# Patient Record
Sex: FEMALE | Race: OTHER | NOT HISPANIC OR LATINO | Employment: OTHER | URBAN - METROPOLITAN AREA
[De-identification: names, ages, dates, MRNs, and addresses within clinical notes are randomized per-mention and may not be internally consistent; named-entity substitution may affect disease eponyms.]

---
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
## 2024-02-02 NOTE — PATIENT DISCUSSION
CHIEF COMPLAINT:    Mrs. Rodriguez is a 82 y.o. female presenting for further evaluation of mixed incontinence.    PRESENTING ILLNESS:    Kassandra Rodriguez is a 82 y.o. female who presents with a history of hypertension, Guillain [place] syndrome, urgency and urge incontinence.  She has previously been seen in the department by Cindy Corral and Ruby Cook.  She states that she has urgency and urge incontinence with no lead time. Stand up incontinence.  Did pelvic floor PT.  Was prescribed Estrace and she uses it.  She was also prescribed Myrbetriq which she never took because she feels like she takes too many medications.  She has a memory disorder.   She is accompanied by her friend today. Patient lives independently.   She is to the point where she desires surgical intervention.      Duration of symptoms:  2 years    Symptoms/things that worsen symptoms:  cough, sneeze, but also has urge inconctinence    Frequency:  > 8    Nocturia:  0    Pad use:  wears a pad with toilet paper lining    Adaptations (bathroom maps, decrease fluid intake)    Previous therapies:  myrbetriq 25 mg (never took it, was prescribed)    Activities the incontinence prevents:  none    Gross hematuria:  none    Recurrent UTI:  none    , uterus in place, not sexually active, no partner, normal bowel movements.     REVIEW OF SYSTEMS:    Review of Systems   Constitutional: Negative.    HENT: Negative.     Eyes: Negative.    Respiratory: Negative.     Cardiovascular: Negative.    Gastrointestinal: Negative.    Genitourinary:  Positive for urgency.        Mixed incontinence   Musculoskeletal: Negative.    Skin: Negative.    Neurological: Negative.    Endo/Heme/Allergies: Negative.    Psychiatric/Behavioral:  Positive for memory loss.        PATIENT HISTORY:    Past Medical History:   Diagnosis Date    Hearing loss     Hypertension        Past Surgical History:   Procedure Laterality Date    LAPAROSCOPIC CHOLECYSTECTOMY         Family History  The cataracts are visually significant.   Problem Relation Age of Onset    Diabetes Daughter     Cancer Daughter     Hypertension Daughter     Hypertension Son     Diabetes Son     Diabetes Son     No Known Problems Son        Social History     Socioeconomic History    Marital status:     Number of children: 7   Occupational History    Occupation: retired   Tobacco Use    Smoking status: Former     Current packs/day: 0.00     Average packs/day: 0.5 packs/day for 20.0 years (10.0 ttl pk-yrs)     Types: Cigarettes     Start date:      Quit date:      Years since quittin.1     Passive exposure: Past    Smokeless tobacco: Never   Substance and Sexual Activity    Alcohol use: Not Currently    Drug use: Never    Sexual activity: Not Currently     Partners: Male   Social History Narrative    Social History    Work: Retired     Alcohol: Denies     Smoker: Past smoker     Exercise: Walk 2-3 times a week for 30 minutes.     Diet: General healthy diet.     Vitamins: Calcium Vitamin D     Dental visits: 2 months ago     Social Determinants of Health     Financial Resource Strain: Low Risk  (1/3/2023)    Overall Financial Resource Strain (CARDIA)     Difficulty of Paying Living Expenses: Not very hard   Food Insecurity: No Food Insecurity (1/3/2023)    Hunger Vital Sign     Worried About Running Out of Food in the Last Year: Never true     Ran Out of Food in the Last Year: Never true   Transportation Needs: No Transportation Needs (1/3/2023)    PRAPARE - Transportation     Lack of Transportation (Medical): No     Lack of Transportation (Non-Medical): No   Physical Activity: Insufficiently Active (1/3/2023)    Exercise Vital Sign     Days of Exercise per Week: 3 days     Minutes of Exercise per Session: 40 min   Stress: No Stress Concern Present (1/3/2023)    [employer] - Occupational Stress Questionnaire     Feeling of Stress : Not at all   Social Connections: Moderately Integrated (1/3/2023)    Social Connection and  Isolation Panel [NHANES]     Frequency of Communication with Friends and Family: More than three times a week     Frequency of Social Gatherings with Friends and Family: Once a week     Attends Worship Services: More than 4 times per year     Active Member of Clubs or Organizations: Yes     Attends Club or Organization Meetings: Never     Marital Status:    Housing Stability: Low Risk  (1/3/2023)    Housing Stability Vital Sign     Unable to Pay for Housing in the Last Year: No     Number of Places Lived in the Last Year: 1     Unstable Housing in the Last Year: No       Allergies:  Penicillins    Medications:  Outpatient Encounter Medications as of 2/2/2024   Medication Sig Dispense Refill    acetaminophen (TYLENOL) 500 MG tablet Take 500 mg by mouth every 6 (six) hours as needed for Pain.      amLODIPine (NORVASC) 5 MG tablet Take 1 tablet by mouth once daily 90 tablet 3    aspirin (ECOTRIN) 81 MG EC tablet Take 81 mg by mouth once daily.      calcium-vitamin D 250-100 mg-unit per tablet Take 1 tablet by mouth 2 (two) times daily.      estradioL (ESTRACE) 0.01 % (0.1 mg/gram) vaginal cream Place 3 g vaginally once daily. 90 g 1    multivit-mins no.63/iron/folic (M-VIT ORAL) Take 1 tablet by mouth.      vitamin D (VITAMIN D3) 1000 units Tab Take 1,000 Units by mouth once daily.      clobetasol 0.05% (TEMOVATE) 0.05 % Oint Apply 1-2 times daily to top of vaginal lips/vulva area (Patient not taking: Reported on 1/3/2023) 30 g 0    fluticasone propionate (FLONASE) 50 mcg/actuation nasal spray 1 spray (50 mcg total) by Each Nostril route once daily. (Patient not taking: Reported on 1/26/2023) 15.8 mL 0    levocetirizine (XYZAL) 5 MG tablet Take 1 tablet (5 mg total) by mouth every evening. 30 tablet 11    memantine (NAMENDA) 5 MG Tab Take 1 tablet (5 mg total) by mouth 2 (two) times daily. (Patient not taking: Reported on 2/2/2024) 60 tablet 3    mirabegron (MYRBETRIQ) 25 mg Tb24 ER tablet Take 1 tablet (25  mg total) by mouth once daily. (Patient not taking: Reported on 2/2/2024) 30 tablet 11    TURMERIC ROOT EXTRACT ORAL Take by mouth.       No facility-administered encounter medications on file as of 2/2/2024.         PHYSICAL EXAMINATION:    The patient generally appears in good health, is appropriately interactive, and is in no apparent distress.    Skin: No lesions.    Mental: Cooperative with normal affect.    Neuro: Grossly intact.    HEENT: Normal. No evidence of lymphadenopathy.    Chest:  normal inspiratory effort.    Abdomen: Soft, non-tender. No masses or organomegaly. Bladder is not palpable. No evidence of flank discomfort. No evidence of inguinal hernia.    Extremities: No clubbing, cyanosis, or edema    Normal external female genitalia  Has Grade II urogenital atrophy  Urethral meatus is normal  Urethra and bladder are nontender to bimanual exam  Well supported anteriorly and posteriorly   Uterus and cervix are normal  No adnexal masses  Urethral mobility from 0-60 degrees  PVR by catheterization was 20 ml  Incontinence was witnessed  LABS:    Lab Results   Component Value Date    BUN 11 12/15/2023    CREATININE 0.8 12/15/2023       UA 1.015, pH 5, otherwise, negative.     IMPRESSION:    Mixed incontinence    PLAN:    1.  For SUDS/cysto  2.  Discussed that she may need a sling but will see how she does with the UDS.     I spent >40 minutes with the patient of which more than half was spent in direct consultation with the patient in regards to our treatment and plan.